# Patient Record
Sex: FEMALE | Race: WHITE | NOT HISPANIC OR LATINO
[De-identification: names, ages, dates, MRNs, and addresses within clinical notes are randomized per-mention and may not be internally consistent; named-entity substitution may affect disease eponyms.]

---

## 2020-01-07 ENCOUNTER — APPOINTMENT (OUTPATIENT)
Dept: ORTHOPEDIC SURGERY | Facility: CLINIC | Age: 25
End: 2020-01-07
Payer: COMMERCIAL

## 2020-01-07 VITALS — HEIGHT: 58 IN | WEIGHT: 90 LBS | BODY MASS INDEX: 18.89 KG/M2

## 2020-01-07 DIAGNOSIS — Q65.89 OTHER SPECIFIED CONGENITAL DEFORMITIES OF HIP: ICD-10-CM

## 2020-01-07 DIAGNOSIS — Z80.9 FAMILY HISTORY OF MALIGNANT NEOPLASM, UNSPECIFIED: ICD-10-CM

## 2020-01-07 DIAGNOSIS — Z78.9 OTHER SPECIFIED HEALTH STATUS: ICD-10-CM

## 2020-01-07 DIAGNOSIS — Z72.3 LACK OF PHYSICAL EXERCISE: ICD-10-CM

## 2020-01-07 PROBLEM — Z00.00 ENCOUNTER FOR PREVENTIVE HEALTH EXAMINATION: Status: ACTIVE | Noted: 2020-01-07

## 2020-01-07 PROCEDURE — 99215 OFFICE O/P EST HI 40 MIN: CPT

## 2020-01-07 PROCEDURE — 73521 X-RAY EXAM HIPS BI 2 VIEWS: CPT

## 2020-01-12 PROBLEM — Z78.9 NO PERTINENT PAST MEDICAL HISTORY: Status: RESOLVED | Noted: 2020-01-12 | Resolved: 2020-01-12

## 2020-01-12 NOTE — PHYSICAL EXAM
[de-identified] : General: No acute distress, conversant, well-nourished.\par Head: Normocephalic, atraumatic\par Neck: trachea midline, FROM\par Heart: normotensive and normal rate and rhythm\par Lungs: No labored breathing\par Skin: No abrasions, no rashes, no edema\par Psych: Alert and oriented to person, place and time\par Extremities: no peripheral edema or digital cyanosis\par Gait: Antalgic gait\par Vascular: warm and well perfused distally, palpable distal pulses\par \par MSK\par Right hip: groin pain with flexion IR and ER\par SILT m/l/1st DWS\par +motor EHL/FHL/TA\par WWP distally [de-identified] : Right hip radiographs obtained in the office today shows evidence of previous Gwyj-Jinql-Lrqutjm disease causing abnormalitiy of femoral head and shortening. Normal

## 2020-01-12 NOTE — HISTORY OF PRESENT ILLNESS
[de-identified] : 24 year old female with leg length discrepancy complaining of R hip pains for about a year, been worse for the past 3 weeks. Pt went to chiropractor who did x-ray and told her looks like the hip was deuterating.  Pt is having a hard time sleeping, states shes having a sharp pain in the R leg. Denies swelling and bruising

## 2020-01-12 NOTE — ASSESSMENT
[FreeTextEntry1] : 24 year old female with right hip pain and limb length discrepancy.  She has evidence of LCPD.  She will be sent for orthotics.  She will also be sent for physical therapy.  She can take NSAIDs as needed for pain.  She will followup in 3 months.  She knows to call with any questions or concerns or if her symptoms acutely worsen.

## 2020-06-29 DIAGNOSIS — M21.70 UNEQUAL LIMB LENGTH (ACQUIRED), UNSPECIFIED SITE: ICD-10-CM

## 2020-11-11 DIAGNOSIS — M25.551 PAIN IN RIGHT HIP: ICD-10-CM
